# Patient Record
Sex: FEMALE | Race: WHITE
[De-identification: names, ages, dates, MRNs, and addresses within clinical notes are randomized per-mention and may not be internally consistent; named-entity substitution may affect disease eponyms.]

---

## 2018-09-05 ENCOUNTER — HOSPITAL ENCOUNTER (OUTPATIENT)
Dept: HOSPITAL 76 - DI | Age: 43
Discharge: HOME | End: 2018-09-05
Attending: OBSTETRICS & GYNECOLOGY
Payer: COMMERCIAL

## 2018-09-05 DIAGNOSIS — R10.32: Primary | ICD-10-CM

## 2018-09-05 PROCEDURE — 76856 US EXAM PELVIC COMPLETE: CPT

## 2018-09-05 PROCEDURE — 76830 TRANSVAGINAL US NON-OB: CPT

## 2018-09-05 NOTE — ULTRASOUND REPORT
Reason:  LEFT LOWER QUADRANT PAIN

Procedure Date:  09/05/2018   

Accession Number:  [id number] / [id number]                    

Procedure:  US  - Pelvic w/Transvaginal CPT Code:  

 

FULL RESULT:

 

 

EXAM:

PELVIC ULTRASOUND

 

EXAM DATE: 9/5/2018 02:53 PM.

 

CLINICAL HISTORY: LEFT LOWER QUADRANT PAIN.

 

COMPARISON: CT abdomen and pelvis 03/22/2014, OB ultrasound 07/23/2008

 

TECHNIQUE: Realtime transabdominal pelvic scan performed to identify the 

uterus and adnexa and as an overview of other pelvic structures, followed 

by transvaginal scan to provide greater detail of the uterus and adnexa, 

with static image documentation.

 

FINDINGS:

Uterus: 8.8 x 4.3 x 3.6 cm, volume 71 cc. Anteverted position. Normal 

overall size and heterogeneity in echotexture.

Masses: Cannot exclude small sub-centimeter fibroids.

Endometrium: 4.7 mm. Normal.

Cervix: Unremarkable.

 

Right Ovary: 1.6 x 2.1 x 0.9 cm, volume 1.6 cc. Normal echotexture and 

blood flow.

Left Ovary: 2.4 x 1.6 x 1.1 cm, volume 2.2 cc. Normal echotexture and 

blood flow.

 

Free Fluid: None.

 

Other: None.

IMPRESSION: Heterogeneous uterine echotexture. Normal endometrial echo 

thickness. No obvious explanation for left lower quadrant pain identified.

[facility]

## 2019-12-17 ENCOUNTER — HOSPITAL ENCOUNTER (OUTPATIENT)
Dept: HOSPITAL 76 - LAB.R | Age: 44
Discharge: HOME | End: 2019-12-17
Attending: ADVANCED PRACTICE MIDWIFE
Payer: COMMERCIAL

## 2019-12-17 DIAGNOSIS — N39.0: Primary | ICD-10-CM

## 2019-12-17 PROCEDURE — 87086 URINE CULTURE/COLONY COUNT: CPT

## 2020-01-02 ENCOUNTER — HOSPITAL ENCOUNTER (OUTPATIENT)
Dept: HOSPITAL 76 - LAB.R | Age: 45
Discharge: HOME | End: 2020-01-02
Attending: OBSTETRICS & GYNECOLOGY
Payer: COMMERCIAL

## 2020-01-02 DIAGNOSIS — N39.0: Primary | ICD-10-CM

## 2020-01-02 PROCEDURE — 87077 CULTURE AEROBIC IDENTIFY: CPT

## 2020-01-02 PROCEDURE — 87086 URINE CULTURE/COLONY COUNT: CPT

## 2020-01-02 PROCEDURE — 87181 SC STD AGAR DILUTION PER AGT: CPT

## 2020-01-23 ENCOUNTER — HOSPITAL ENCOUNTER (OUTPATIENT)
Dept: HOSPITAL 76 - DI | Age: 45
Discharge: HOME | End: 2020-01-23
Attending: ADVANCED PRACTICE MIDWIFE
Payer: COMMERCIAL

## 2020-01-23 DIAGNOSIS — Z98.82: ICD-10-CM

## 2020-01-23 DIAGNOSIS — Z12.31: Primary | ICD-10-CM

## 2020-01-23 PROCEDURE — 77063 BREAST TOMOSYNTHESIS BI: CPT

## 2020-01-23 PROCEDURE — 77067 SCR MAMMO BI INCL CAD: CPT

## 2020-01-27 NOTE — MAMMOGRAPHY REPORT
Reason:  ROUTINE MAMMO

Procedure Date:  01/23/2020   

Accession Number:  [id number] / [id number]                    

Procedure:  YANDEL - Screening Mammo Impl w/Toñito CPT Code:  

 

***Final Report***

 

 

FULL RESULT:

 

 

EXAM: Screening Mammo Impl w/Toñito

 

DATE: 1/23/2020 9:51 AM

 

CLINICAL HISTORY:  Screening encounter. History of bilateral saline 

implants.

 

TECHNIQUE: (B) - Bilateral  CC, laterally exaggerated CC, MLO views were 

obtained. Images are obtained in standard and implant displaced fashion.

 

COMPARISON: 4/28/2016.

 

PARENCHYMAL PATTERN: (D) - The breast(s) demonstrate(s) heterogeneously 

dense fibroglandular parenchyma.

 

FINDINGS:

Bilateral retropectoral breast implants are again demonstrated. There are 

no suspicious masses, calcifications, or areas of distortion.

 

IMPRESSION: Benign findings. BI-RADS category 2.

 

RECOMMENDATION: (ANNUAL)  - Recommend routine annual screening 

mammography.

 

BI-RADS CATEGORY: (2) - Benign Findings.

 

STANDARD QUALIFYING STATEMENTS:

1.  This examination was not reviewed with the aid of Computer-Aided 

Detection (CAD).

2.  A negative or benign  imaging report should not preclude biopsy if 

clinically suspicious findings are present.

3.  Dense breasts may obscure an underlying neoplasm.

4. This examination was reviewed with the aid of 3D breast imaging 

(tomosynthesis).

## 2021-04-05 ENCOUNTER — HOSPITAL ENCOUNTER (OUTPATIENT)
Dept: HOSPITAL 76 - LAB | Age: 46
Discharge: HOME | End: 2021-04-05
Attending: ADVANCED PRACTICE MIDWIFE
Payer: COMMERCIAL

## 2021-04-05 DIAGNOSIS — Z00.00: Primary | ICD-10-CM

## 2021-04-05 LAB
CHOLEST SERPL-MCNC: 226 MG/DL
EST. AVERAGE GLUCOSE BLD GHB EST-MCNC: 91 MG/DL (ref 70–100)
HBA1C MFR BLD HPLC: 4.8 % (ref 4.27–6.07)
HDLC SERPL-MCNC: 84 MG/DL
HDLC SERPL: 2.7 {RATIO} (ref ?–4.4)
LDLC SERPL CALC-MCNC: 92 MG/DL
LDLC/HDLC SERPL: 1.1 {RATIO} (ref ?–4.4)
TRIGL P FAST SERPL-MCNC: 252 MG/DL
VLDLC SERPL-SCNC: 50 MG/DL

## 2021-04-05 PROCEDURE — 84443 ASSAY THYROID STIM HORMONE: CPT

## 2021-04-05 PROCEDURE — 83036 HEMOGLOBIN GLYCOSYLATED A1C: CPT

## 2021-04-05 PROCEDURE — 80061 LIPID PANEL: CPT

## 2021-04-05 PROCEDURE — 83721 ASSAY OF BLOOD LIPOPROTEIN: CPT

## 2021-04-05 PROCEDURE — 36415 COLL VENOUS BLD VENIPUNCTURE: CPT
